# Patient Record
Sex: MALE | Race: WHITE | NOT HISPANIC OR LATINO | Employment: FULL TIME | URBAN - METROPOLITAN AREA
[De-identification: names, ages, dates, MRNs, and addresses within clinical notes are randomized per-mention and may not be internally consistent; named-entity substitution may affect disease eponyms.]

---

## 2017-12-12 ENCOUNTER — HOSPITAL ENCOUNTER (EMERGENCY)
Facility: HOSPITAL | Age: 42
Discharge: PSYCHIATRIC OR SUBSTANCE USE FACILITY | End: 2017-12-13
Attending: EMERGENCY MEDICINE
Payer: COMMERCIAL

## 2017-12-12 DIAGNOSIS — R45.851 SUICIDAL IDEATION: Primary | ICD-10-CM

## 2017-12-12 DIAGNOSIS — F11.11 HISTORY OF HEROIN ABUSE: ICD-10-CM

## 2017-12-12 LAB
ALBUMIN SERPL BCP-MCNC: 3.8 G/DL
ALP SERPL-CCNC: 66 U/L
ALT SERPL W/O P-5'-P-CCNC: 25 U/L
ANION GAP SERPL CALC-SCNC: 10 MMOL/L
APAP SERPL-MCNC: <3 UG/ML
AST SERPL-CCNC: 21 U/L
BASOPHILS # BLD AUTO: 0.02 K/UL
BASOPHILS NFR BLD: 0.3 %
BILIRUB SERPL-MCNC: 0.9 MG/DL
BUN SERPL-MCNC: 17 MG/DL
CALCIUM SERPL-MCNC: 9.4 MG/DL
CHLORIDE SERPL-SCNC: 105 MMOL/L
CO2 SERPL-SCNC: 27 MMOL/L
CREAT SERPL-MCNC: 1.1 MG/DL
DIFFERENTIAL METHOD: ABNORMAL
EOSINOPHIL # BLD AUTO: 0.2 K/UL
EOSINOPHIL NFR BLD: 3.4 %
ERYTHROCYTE [DISTWIDTH] IN BLOOD BY AUTOMATED COUNT: 13.2 %
EST. GFR  (AFRICAN AMERICAN): >60 ML/MIN/1.73 M^2
EST. GFR  (NON AFRICAN AMERICAN): >60 ML/MIN/1.73 M^2
ETHANOL SERPL-MCNC: <10 MG/DL
GLUCOSE SERPL-MCNC: 144 MG/DL
HCT VFR BLD AUTO: 46.5 %
HGB BLD-MCNC: 15.6 G/DL
LYMPHOCYTES # BLD AUTO: 1.8 K/UL
LYMPHOCYTES NFR BLD: 27.8 %
MCH RBC QN AUTO: 30.9 PG
MCHC RBC AUTO-ENTMCNC: 33.5 G/DL
MCV RBC AUTO: 92 FL
MONOCYTES # BLD AUTO: 0.6 K/UL
MONOCYTES NFR BLD: 8.9 %
NEUTROPHILS # BLD AUTO: 3.8 K/UL
NEUTROPHILS NFR BLD: 59.6 %
PLATELET # BLD AUTO: 224 K/UL
PMV BLD AUTO: 8.7 FL
POTASSIUM SERPL-SCNC: 3.8 MMOL/L
PROT SERPL-MCNC: 7.1 G/DL
RBC # BLD AUTO: 5.05 M/UL
SALICYLATES SERPL-MCNC: <5 MG/DL
SODIUM SERPL-SCNC: 142 MMOL/L
WBC # BLD AUTO: 6.43 K/UL

## 2017-12-12 PROCEDURE — 93010 ELECTROCARDIOGRAM REPORT: CPT | Mod: ,,, | Performed by: INTERNAL MEDICINE

## 2017-12-12 PROCEDURE — 93005 ELECTROCARDIOGRAM TRACING: CPT

## 2017-12-12 PROCEDURE — 80307 DRUG TEST PRSMV CHEM ANLYZR: CPT

## 2017-12-12 PROCEDURE — 84443 ASSAY THYROID STIM HORMONE: CPT

## 2017-12-12 PROCEDURE — 84439 ASSAY OF FREE THYROXINE: CPT

## 2017-12-12 PROCEDURE — 80329 ANALGESICS NON-OPIOID 1 OR 2: CPT

## 2017-12-12 PROCEDURE — 80320 DRUG SCREEN QUANTALCOHOLS: CPT

## 2017-12-12 PROCEDURE — 80053 COMPREHEN METABOLIC PANEL: CPT

## 2017-12-12 PROCEDURE — 85025 COMPLETE CBC W/AUTO DIFF WBC: CPT

## 2017-12-12 PROCEDURE — 99285 EMERGENCY DEPT VISIT HI MDM: CPT | Mod: 25

## 2017-12-12 RX ORDER — IBUPROFEN 200 MG
1 TABLET ORAL DAILY
Status: DISCONTINUED | OUTPATIENT
Start: 2017-12-13 | End: 2017-12-13 | Stop reason: HOSPADM

## 2017-12-13 VITALS
HEIGHT: 70 IN | HEART RATE: 55 BPM | OXYGEN SATURATION: 97 % | DIASTOLIC BLOOD PRESSURE: 64 MMHG | SYSTOLIC BLOOD PRESSURE: 134 MMHG | WEIGHT: 190.13 LBS | BODY MASS INDEX: 27.22 KG/M2 | RESPIRATION RATE: 18 BRPM | TEMPERATURE: 98 F

## 2017-12-13 LAB
AMPHET+METHAMPHET UR QL: NORMAL
BARBITURATES UR QL SCN>200 NG/ML: NEGATIVE
BENZODIAZ UR QL SCN>200 NG/ML: NEGATIVE
BILIRUB UR QL STRIP: NEGATIVE
BZE UR QL SCN: NEGATIVE
CANNABINOIDS UR QL SCN: NEGATIVE
CLARITY UR: CLEAR
COLOR UR: YELLOW
CREAT UR-MCNC: 228.1 MG/DL
GLUCOSE UR QL STRIP: ABNORMAL
HGB UR QL STRIP: NEGATIVE
KETONES UR QL STRIP: NEGATIVE
LEUKOCYTE ESTERASE UR QL STRIP: NEGATIVE
METHADONE UR QL SCN>300 NG/ML: NEGATIVE
NITRITE UR QL STRIP: NEGATIVE
OPIATES UR QL SCN: NORMAL
PCP UR QL SCN>25 NG/ML: NEGATIVE
PH UR STRIP: 6 [PH] (ref 5–8)
PROT UR QL STRIP: NEGATIVE
SP GR UR STRIP: 1.02 (ref 1–1.03)
T4 FREE SERPL-MCNC: 1.04 NG/DL
TOXICOLOGY INFORMATION: NORMAL
TSH SERPL DL<=0.005 MIU/L-ACNC: 0.22 UIU/ML
URN SPEC COLLECT METH UR: ABNORMAL
UROBILINOGEN UR STRIP-ACNC: 1 EU/DL

## 2017-12-13 PROCEDURE — 80307 DRUG TEST PRSMV CHEM ANLYZR: CPT

## 2017-12-13 PROCEDURE — 81003 URINALYSIS AUTO W/O SCOPE: CPT | Mod: 59

## 2017-12-13 NOTE — ED NOTES
Pt's room secured per protocol. Pt's belongings secured and pt placed in grey gown and yellow socks. Pt being directly monitored by angel Perez at this time.

## 2017-12-13 NOTE — ED NOTES
Risks and benefits of transfer discussed with patient who verbalizes understanding.  [facility] transfer form signed by patient and MD and added to chart.

## 2017-12-13 NOTE — ED NOTES
Spoke to Lisa at [facility] (Formerly [facility]).    Patient has been accepted by Dr. Alexandra.    Call report to [phone].    Address:  [address] [place] [id number]

## 2017-12-13 NOTE — ED NOTES
Patient's belongings include: two bags of personal clothing, shoes, heavy winter coat, cigarettes and lighter, two smart phones, cell phone , wallet with no cash, sunglasses, toiletries, and baseball cap.

## 2017-12-13 NOTE — ED PROVIDER NOTES
SCRIBE #1 NOTE: I, Db Jose Puentes, am scribing for, and in the presence of, Darleen Pool MD. I have scribed the entire note.      History      Chief Complaint   Patient presents with    Suicidal     Pt reports suicidal thoughts x3days. Last shot up heroin this AM.       Review of patient's allergies indicates:  No Known Allergies     HPI   HPI    12/12/2017, 10:52 PM   History obtained from the patient      History of Present Illness: Mickey Salvador is a 41 y.o. male patient who presents to the Emergency Department for suicidal ideations which onset gradually today. Pt reports relapsing with crystal meth and heroin this morning. Pt states he snorts heroin. Sxs are constant and moderate in severity. Pt has plan to kill himself with gun. There are no mitigating or exacerbating factors noted. Associated sxs include depression, anxiety, sleeplessness.  Pt denies any fever, N/V/D, self injury, HI, hallucinations, CP, LOC, and all other sxs at this time. No further complaints or concerns at this time.       Arrival mode: Personal vehicle      PCP: Angus Nicholas MD       Past Medical History:  Past Medical History:   Diagnosis Date    Hyperlipidemia        Past Surgical History:  Past Surgical History:   Procedure Laterality Date    KIDNEY STONE SURGERY      2004         Family History:  Family History   Problem Relation Age of Onset    Hyperlipidemia Father        Social History:  Social History     Social History Main Topics    Smoking status: Former Smoker    Smokeless tobacco: unknown    Alcohol use unknown    Drug use: Unknown    Sexual activity: unknown       ROS   Review of Systems   Constitutional: Negative for fever.   HENT: Negative for sore throat.    Respiratory: Negative for shortness of breath.    Cardiovascular: Negative for chest pain.   Gastrointestinal: Negative for nausea.   Genitourinary: Negative for dysuria.   Musculoskeletal: Negative for back pain.   Skin: Negative for rash and  wound.   Neurological: Negative for weakness.   Hematological: Does not bruise/bleed easily.   Psychiatric/Behavioral: Positive for sleep disturbance and suicidal ideas. Negative for agitation, hallucinations and self-injury. The patient is nervous/anxious.      Physical Exam      Initial Vitals [12/12/17 2238]   BP Pulse Resp Temp SpO2   (!) 159/75 77 20 98.4 °F (36.9 °C) 98 %      MAP       103          Physical Exam  Nursing Notes and Vital Signs Reviewed.  Constitutional: Patient is in no acute distress. Well-developed and well-nourished.  Head: Atraumatic. Normocephalic.  Eyes: PERRL. EOM intact. Conjunctivae are not pale. No scleral icterus.  ENT: Mucous membranes are moist. Oropharynx is clear and symmetric.    Neck: Supple. Full ROM. No lymphadenopathy.  Cardiovascular: Regular rate. Regular rhythm. No murmurs, rubs, or gallops. Distal pulses are 2+ and symmetric.  Pulmonary/Chest: No respiratory distress. Clear to auscultation bilaterally. No wheezing or rales.  Abdominal: Soft and non-distended.  There is no tenderness.  No rebound, guarding, or rigidity. Good bowel sounds.  Genitourinary: No CVA tenderness  Musculoskeletal: Moves all extremities. No obvious deformities. No edema. No calf tenderness  Skin: Warm and dry.  Neurological:  Alert, awake, and appropriate.  Normal speech.  No acute focal neurological deficits are appreciated.  Psychiatric:               Behavior: cooperative              Mood and Affect: flat affect              Thought Process: within normal limits              Suicidal Ideations: Yes              Suicidal Plan: Specific plan to harm self.  GSW.              Homicidal Ideations: No              Hallucinations: none      ED Course    Procedures  ED Vital Signs:  Vitals:    12/12/17 2238 12/13/17 0045 12/13/17 0302   BP: (!) 159/75 (!) 147/80 134/64   Pulse: 77 61 (!) 55   Resp: 20 20 18   Temp: 98.4 °F (36.9 °C)  98.1 °F (36.7 °C)   TempSrc: Oral     SpO2: 98% 98% 97%   Weight:  "86.3 kg (190 lb 2.4 oz)     Height: 5' 10" (1.778 m)         Abnormal Lab Results:  Labs Reviewed   CBC W/ AUTO DIFFERENTIAL - Abnormal; Notable for the following:        Result Value    MPV 8.7 (*)     All other components within normal limits   COMPREHENSIVE METABOLIC PANEL - Abnormal; Notable for the following:     Glucose 144 (*)     All other components within normal limits   TSH - Abnormal; Notable for the following:     TSH 0.221 (*)     All other components within normal limits   URINALYSIS - Abnormal; Notable for the following:     Glucose, UA 1+ (*)     All other components within normal limits   ACETAMINOPHEN LEVEL - Abnormal; Notable for the following:     Acetaminophen (Tylenol), Serum <3.0 (*)     All other components within normal limits   SALICYLATE LEVEL - Abnormal; Notable for the following:     Salicylate Lvl <5.0 (*)     All other components within normal limits   DRUG SCREEN PANEL, URINE EMERGENCY   ALCOHOL,MEDICAL (ETHANOL)   T4, FREE        All Lab Results:  Results for orders placed or performed during the hospital encounter of 12/12/17   CBC auto differential   Result Value Ref Range    WBC 6.43 3.90 - 12.70 K/uL    RBC 5.05 4.60 - 6.20 M/uL    Hemoglobin 15.6 14.0 - 18.0 g/dL    Hematocrit 46.5 40.0 - 54.0 %    MCV 92 82 - 98 fL    MCH 30.9 27.0 - 31.0 pg    MCHC 33.5 32.0 - 36.0 g/dL    RDW 13.2 11.5 - 14.5 %    Platelets 224 150 - 350 K/uL    MPV 8.7 (L) 9.2 - 12.9 fL    Gran # 3.8 1.8 - 7.7 K/uL    Lymph # 1.8 1.0 - 4.8 K/uL    Mono # 0.6 0.3 - 1.0 K/uL    Eos # 0.2 0.0 - 0.5 K/uL    Baso # 0.02 0.00 - 0.20 K/uL    Gran% 59.6 38.0 - 73.0 %    Lymph% 27.8 18.0 - 48.0 %    Mono% 8.9 4.0 - 15.0 %    Eosinophil% 3.4 0.0 - 8.0 %    Basophil% 0.3 0.0 - 1.9 %    Differential Method Automated    Comprehensive metabolic panel   Result Value Ref Range    Sodium 142 136 - 145 mmol/L    Potassium 3.8 3.5 - 5.1 mmol/L    Chloride 105 95 - 110 mmol/L    CO2 27 23 - 29 mmol/L    Glucose 144 (H) 70 - " 110 mg/dL    BUN, Bld 17 6 - 20 mg/dL    Creatinine 1.1 0.5 - 1.4 mg/dL    Calcium 9.4 8.7 - 10.5 mg/dL    Total Protein 7.1 6.0 - 8.4 g/dL    Albumin 3.8 3.5 - 5.2 g/dL    Total Bilirubin 0.9 0.1 - 1.0 mg/dL    Alkaline Phosphatase 66 55 - 135 U/L    AST 21 10 - 40 U/L    ALT 25 10 - 44 U/L    Anion Gap 10 8 - 16 mmol/L    eGFR if African American >60 >60 mL/min/1.73 m^2    eGFR if non African American >60 >60 mL/min/1.73 m^2   TSH   Result Value Ref Range    TSH 0.221 (L) 0.400 - 4.000 uIU/mL   Urinalysis - clean catch   Result Value Ref Range    Specimen UA Urine, Clean Catch     Color, UA Yellow Yellow, Straw, Radha    Appearance, UA Clear Clear    pH, UA 6.0 5.0 - 8.0    Specific Gravity, UA 1.025 1.005 - 1.030    Protein, UA Negative Negative    Glucose, UA 1+ (A) Negative    Ketones, UA Negative Negative    Bilirubin (UA) Negative Negative    Occult Blood UA Negative Negative    Nitrite, UA Negative Negative    Urobilinogen, UA 1.0 <2.0 EU/dL    Leukocytes, UA Negative Negative   Drug screen panel, emergency   Result Value Ref Range    Benzodiazepines Negative     Methadone metabolites Negative     Cocaine (Metab.) Negative     Opiate Scrn, Ur Presumptive Positive     Barbiturate Screen, Ur Negative     Amphetamine Screen, Ur Presumptive Positive     THC Negative     Phencyclidine Negative     Creatinine, Random Ur 228.1 23.0 - 375.0 mg/dL    Toxicology Information SEE COMMENT    Ethanol   Result Value Ref Range    Alcohol, Medical, Serum <10 <10 mg/dL   Acetaminophen level   Result Value Ref Range    Acetaminophen (Tylenol), Serum <3.0 (L) 10.0 - 20.0 ug/mL   Salicylate level   Result Value Ref Range    Salicylate Lvl <5.0 (L) 15.0 - 30.0 mg/dL   T4, free   Result Value Ref Range    Free T4 1.04 0.71 - 1.51 ng/dL                  The EKG was ordered, reviewed, and independently interpreted by the ED provider.  Interpretation time: 22:56  Rate: 66 BPM  Rhythm: normal sinus rhythm  Interpretation: No acute ST  changes. No STEMI.      The Emergency Provider reviewed the vital signs and test results, which are outlined above.    ED Discussion     10:58 PM: The PEC hold has been issued by Dr. Pool at this time for SI.    1:17 AM: Pt has been medically cleared by Dr. Pool at this time. Reassessed pt at this time. Pt is resting comfortably and appears in no acute distress. There are no psychiatric services offered at this facility. D/w pt all pertinent ED information and plan to transfer to psychiatric facility for psychiatric treatment. Pt verbalizes understanding. Patient being transferred by [facility] for ongoing personal protection en route. Pt will be transported by personnel trained in CPR and CPI. All questions and complaints have been addressed at this time. Pt condition is stable at this time and is clear to transfer to psychiatric facility at this time.   Accepting Facility: [facility]  Accepting Physician: Dr. Alexandra    ED Medication(s):  Medications   nicotine 14 mg/24 hr 1 patch (not administered)       There are no discharge medications for this patient.            Medical Decision Making    Medical Decision Making:   Clinical Tests:   Lab Tests: Reviewed and Ordered  Medical Tests: Ordered and Reviewed           Scribe Attestation:   Scribe #1: I performed the above scribed service and the documentation accurately describes the services I performed. I attest to the accuracy of the note.    Attending:   Physician Attestation Statement for Scribe #1: I, Darleen Pool MD, personally performed the services described in this documentation, as scribed by Db Puentes, in my presence, and it is both accurate and complete.          Clinical Impression       ICD-10-CM ICD-9-CM   1. Suicidal ideation R45.851 V62.84   2. History of heroin abuse Z87.898 305.53       Disposition:   Disposition: Transferred  Condition: Stable         Darleen Pool MD  12/13/17 0553

## 2018-03-12 ENCOUNTER — HOSPITAL ENCOUNTER (EMERGENCY)
Facility: HOSPITAL | Age: 43
Discharge: HOME | End: 2018-03-12
Attending: EMERGENCY MEDICINE
Payer: MEDICAID

## 2018-03-12 VITALS
RESPIRATION RATE: 18 BRPM | DIASTOLIC BLOOD PRESSURE: 78 MMHG | OXYGEN SATURATION: 95 % | HEART RATE: 83 BPM | TEMPERATURE: 98 F | WEIGHT: 190.69 LBS | BODY MASS INDEX: 27.36 KG/M2 | SYSTOLIC BLOOD PRESSURE: 140 MMHG

## 2018-03-12 DIAGNOSIS — A54.9 GONORRHEA: Primary | ICD-10-CM

## 2018-03-12 DIAGNOSIS — R36.9 PENILE DISCHARGE: ICD-10-CM

## 2018-03-12 DIAGNOSIS — N34.2 URETHRITIS: ICD-10-CM

## 2018-03-12 DIAGNOSIS — R30.0 DYSURIA: ICD-10-CM

## 2018-03-12 LAB
BACTERIA #/AREA URNS HPF: ABNORMAL /HPF
BILIRUB UR QL STRIP: NEGATIVE
CLARITY UR: CLEAR
COLOR UR: YELLOW
GLUCOSE UR QL STRIP: NEGATIVE
HGB UR QL STRIP: ABNORMAL
KETONES UR QL STRIP: NEGATIVE
LEUKOCYTE ESTERASE UR QL STRIP: ABNORMAL
MICROSCOPIC COMMENT: ABNORMAL
NITRITE UR QL STRIP: NEGATIVE
PH UR STRIP: 6 [PH] (ref 5–8)
PROT UR QL STRIP: NEGATIVE
RBC #/AREA URNS HPF: 3 /HPF (ref 0–4)
SP GR UR STRIP: 1.02 (ref 1–1.03)
URN SPEC COLLECT METH UR: ABNORMAL
UROBILINOGEN UR STRIP-ACNC: NEGATIVE EU/DL
WBC #/AREA URNS HPF: >100 /HPF (ref 0–5)

## 2018-03-12 PROCEDURE — 25000003 PHARM REV CODE 250: Performed by: NURSE PRACTITIONER

## 2018-03-12 PROCEDURE — 99283 EMERGENCY DEPT VISIT LOW MDM: CPT | Mod: 25

## 2018-03-12 PROCEDURE — 96372 THER/PROPH/DIAG INJ SC/IM: CPT

## 2018-03-12 PROCEDURE — 87491 CHLMYD TRACH DNA AMP PROBE: CPT

## 2018-03-12 PROCEDURE — 63600175 PHARM REV CODE 636 W HCPCS: Performed by: NURSE PRACTITIONER

## 2018-03-12 PROCEDURE — 81000 URINALYSIS NONAUTO W/SCOPE: CPT

## 2018-03-12 RX ORDER — AZITHROMYCIN 250 MG/1
1000 TABLET, FILM COATED ORAL
Status: COMPLETED | OUTPATIENT
Start: 2018-03-12 | End: 2018-03-12

## 2018-03-12 RX ORDER — CEFTRIAXONE 250 MG/1
250 INJECTION, POWDER, FOR SOLUTION INTRAMUSCULAR; INTRAVENOUS
Status: COMPLETED | OUTPATIENT
Start: 2018-03-12 | End: 2018-03-12

## 2018-03-12 RX ADMIN — AZITHROMYCIN 1000 MG: 250 TABLET, FILM COATED ORAL at 09:03

## 2018-03-12 RX ADMIN — CEFTRIAXONE SODIUM 250 MG: 250 INJECTION, POWDER, FOR SOLUTION INTRAMUSCULAR; INTRAVENOUS at 09:03

## 2018-03-13 LAB
C TRACH DNA SPEC QL NAA+PROBE: NOT DETECTED
N GONORRHOEA DNA SPEC QL NAA+PROBE: DETECTED

## 2018-03-13 NOTE — ED PROVIDER NOTES
SCRIBE #1 NOTE: I, Gini Vail, am scribing for, and in the presence of, Thad Kinney NP. I have scribed the HPI, ROS, PEx.    SCRIBE #2 NOTE: I, Sherley Mc, am scribing for, and in the presence of,  Thad Kinney NP. I have scribed the remaining portions of the note not scribed by Scribe #1.     History      Chief Complaint   Patient presents with    Dysuria     cloudy urine; discomfort with urination       Review of patient's allergies indicates:  No Known Allergies     HPI   HPI    3/12/2018, 9:24 PM   History obtained from the patient      History of Present Illness: Mickey Salvador is a 42 y.o. male patient with PMHx of kidney stones presents to the Emergency Department for  Dysuria which onset gradually 1 day. Symptoms are constant and moderate in severity. Pt reports that his urine is very cloudy. No mitigating or exacerbating factors reported. No associated sxs reported. Patient denies any hematuria, fever, chills, diaphoresis, flank pain, urinary urgency, urinary frequency, decreased urine output, and all other sxs at this time. No further complaints or concerns at this time.     Arrival mode: Personal vehicle    PCP: Angus Nicholas MD       Past Medical History:  Past Medical History:   Diagnosis Date    Hyperlipidemia     Renal disorder     kidney stones        Past Surgical History:  Past Surgical History:   Procedure Laterality Date    KIDNEY STONE SURGERY      2004         Family History:  Family History   Problem Relation Age of Onset    Hyperlipidemia Father        Social History:  Social History     Social History Main Topics    Smoking status: Current Every Day Smoker     Packs/day: 1.00     Types: Cigarettes    Smokeless tobacco: Former User    Alcohol use Unknown    Drug use: No    Sexual activity: Unknown       ROS   Review of Systems   Constitutional: Negative for fever.   HENT: Negative for sore throat.    Respiratory: Negative for shortness of breath.    Cardiovascular:  Negative for chest pain.   Gastrointestinal: Negative for nausea.   Genitourinary: Positive for discharge, dysuria and penile pain. Negative for penile swelling, scrotal swelling and testicular pain.   Musculoskeletal: Negative for back pain.   Skin: Negative for rash.   Neurological: Negative for weakness.   Hematological: Does not bruise/bleed easily.       Physical Exam      Initial Vitals [03/12/18 2027]   BP Pulse Resp Temp SpO2   (!) 140/78 83 18 98.3 °F (36.8 °C) 95 %      MAP       98.67          Physical Exam  Vital signs and nursing notes reviewed.  Constitutional: Patient is in no acute distress.  Head: Atraumatic and normocephalic.  Eyes: Normal sclera.  ENT: Moist mucosa.  Cardiovascular: Well perfused.  Pulmonary/Chest: No respiratory distress.  Musculoskeletal: Moves all extremities.  : Inflamed meatus. Mucoid purulent discharge. Normal bilateral testicular lie and position. Scrotum and testes appear normal with no discoloration. No scrotal, testicular, or epididymal tenderness. No masses or hernias around the scrotum, testicles, or inguinal canal.;  Skin: Dry and nl in appearance.  Neurological: Awake and alert.  Psychological: Nl affect.      ED Course    Procedures  ED Vital Signs:  Vitals:    03/12/18 2027   BP: (!) 140/78   Pulse: 83   Resp: 18   Temp: 98.3 °F (36.8 °C)   TempSrc: Oral   SpO2: 95%   Weight: 86.5 kg (190 lb 11.2 oz)       Abnormal Lab Results:  Labs Reviewed   C. TRACHOMATIS/N. GONORRHOEAE BY AMP DNA - Abnormal; Notable for the following:        Result Value    N gonorrhoeae, amplified DNA Detected (*)     All other components within normal limits   URINALYSIS - Abnormal; Notable for the following:     Occult Blood UA Trace (*)     Leukocytes, UA 1+ (*)     All other components within normal limits   URINALYSIS MICROSCOPIC - Abnormal; Notable for the following:     WBC, UA >100 (*)     All other components within normal limits        All Lab Results:  Results for orders placed  or performed during the hospital encounter of 03/12/18   C. trachomatis/N. gonorrhoeae by AMP DNA Urine   Result Value Ref Range    Chlamydia, Amplified DNA Not Detected Not Detected    N gonorrhoeae, amplified DNA Detected (A) Not Detected   Urinalysis Clean Catch   Result Value Ref Range    Specimen UA Urine, Clean Catch     Color, UA Yellow Yellow, Straw, Radha    Appearance, UA Clear Clear    pH, UA 6.0 5.0 - 8.0    Specific Gravity, UA 1.025 1.005 - 1.030    Protein, UA Negative Negative    Glucose, UA Negative Negative    Ketones, UA Negative Negative    Bilirubin (UA) Negative Negative    Occult Blood UA Trace (A) Negative    Nitrite, UA Negative Negative    Urobilinogen, UA Negative <2.0 EU/dL    Leukocytes, UA 1+ (A) Negative   Urinalysis Microscopic   Result Value Ref Range    RBC, UA 3 0 - 4 /hpf    WBC, UA >100 (H) 0 - 5 /hpf    Bacteria, UA Rare None-Occ /hpf    Microscopic Comment SEE COMMENT             The Emergency Provider reviewed the vital signs and test results, which are outlined above.    ED Discussion       10:37 PM: Reassessed pt at this time. Pt is in no distress. Discussed with pt all pertinent ED information and results. Discussed pt diagnosis and plan of treatment. Gave pt all f/u and return to the ED instructions. All questions and concerns were addressed at this time. Pt expresses understanding of information and instructions, and is comfortable with plan to discharge. Pt is stable for discharge.    I discussed with patient and/or family/caretaker that evaluation in the ED does not suggest any emergent or life threatening medical conditions requiring immediate intervention beyond what was provided in the ED, and I believe patient is safe for discharge.  Regardless, an unremarkable evaluation in the ED does not preclude the development or presence of a serious of life threatening condition. As such, patient was instructed to return immediately for any worsening or change in current  symptoms.'      ED Medication(s):  Medications   cefTRIAXone injection 250 mg (250 mg Intramuscular Given 3/12/18 2156)   azithromycin tablet 1,000 mg (1,000 mg Oral Given 3/12/18 2157)       There are no discharge medications for this patient.      Follow-up Information     Angus Nicholas MD. Schedule an appointment as soon as possible for a visit in 2 days.    Specialty:  Internal Medicine  Contact information:  [address]  [facility] [address]  [phone]             [facility] - .    Specialty:  Emergency Medicine  Why:  As needed, If symptoms worsen  Contact information:  [address]  [facility] [address] [phone]                   Medical Decision Making    Medical Decision Making:   Clinical Tests:   Lab Tests: Reviewed and Ordered           Scribe Attestation:   Scribe #1: I performed the above scribed service and the documentation accurately describes the services I performed. I attest to the accuracy of the note.    Attending:   Physician Attestation Statement for Scribe #1: I, Thad Kinney NP, personally performed the services described in this documentation, as scribed by Gini Vail, in my presence, and it is both accurate and complete.       Scribe Attestation:   Scribe #2: I performed the above scribed service and the documentation accurately describes the services I performed. I attest to the accuracy of the note.    Attending Attestation:           Physician Attestation for Scribe:    Physician Attestation Statement for Scribe #2: I, Thad Kinney NP, reviewed documentation, as scribed by Sherley Mc in my presence, and it is both accurate and complete. I also acknowledge and confirm the content of the note done by Scribe #1.          Clinical Impression       ICD-10-CM ICD-9-CM   1. Gonorrhea A54.9 098.0   2. Dysuria R30.0 788.1   3. Urethritis N34.2 597.80   4. Penile discharge R36.9 788.7       Disposition:   Disposition:  Discharged  Condition: Stable           Thad Kinney NP  03/14/18 7828

## 2018-03-16 ENCOUNTER — HOSPITAL ENCOUNTER (EMERGENCY)
Facility: HOSPITAL | Age: 43
Discharge: HOME | End: 2018-03-16
Attending: EMERGENCY MEDICINE
Payer: MEDICAID

## 2018-03-16 VITALS
SYSTOLIC BLOOD PRESSURE: 107 MMHG | WEIGHT: 183 LBS | HEIGHT: 70 IN | DIASTOLIC BLOOD PRESSURE: 56 MMHG | HEART RATE: 60 BPM | BODY MASS INDEX: 26.2 KG/M2 | TEMPERATURE: 99 F | RESPIRATION RATE: 18 BRPM | OXYGEN SATURATION: 97 %

## 2018-03-16 DIAGNOSIS — F14.90 COCAINE USE: ICD-10-CM

## 2018-03-16 DIAGNOSIS — T40.1X1A HEROIN OVERDOSE, ACCIDENTAL OR UNINTENTIONAL, INITIAL ENCOUNTER: Primary | ICD-10-CM

## 2018-03-16 DIAGNOSIS — F19.10 POLYSUBSTANCE ABUSE: ICD-10-CM

## 2018-03-16 DIAGNOSIS — F11.90 OPIATE USE: ICD-10-CM

## 2018-03-16 DIAGNOSIS — F19.10 IV DRUG ABUSE: ICD-10-CM

## 2018-03-16 LAB
ALBUMIN SERPL BCP-MCNC: 3.8 G/DL
ALP SERPL-CCNC: 66 U/L
ALT SERPL W/O P-5'-P-CCNC: 16 U/L
AMPHET+METHAMPHET UR QL: NEGATIVE
ANION GAP SERPL CALC-SCNC: 7 MMOL/L
APAP SERPL-MCNC: <3 UG/ML
AST SERPL-CCNC: 18 U/L
BACTERIA #/AREA URNS HPF: NORMAL /HPF
BARBITURATES UR QL SCN>200 NG/ML: NEGATIVE
BASOPHILS # BLD AUTO: 0.03 K/UL
BASOPHILS NFR BLD: 0.2 %
BENZODIAZ UR QL SCN>200 NG/ML: NEGATIVE
BILIRUB SERPL-MCNC: 0.6 MG/DL
BILIRUB UR QL STRIP: NEGATIVE
BUN SERPL-MCNC: 11 MG/DL
BZE UR QL SCN: NORMAL
CALCIUM SERPL-MCNC: 9.2 MG/DL
CANNABINOIDS UR QL SCN: NEGATIVE
CHLORIDE SERPL-SCNC: 104 MMOL/L
CLARITY UR: CLEAR
CO2 SERPL-SCNC: 27 MMOL/L
COLOR UR: YELLOW
CREAT SERPL-MCNC: 0.8 MG/DL
CREAT UR-MCNC: 257.7 MG/DL
DIFFERENTIAL METHOD: ABNORMAL
EOSINOPHIL # BLD AUTO: 0.1 K/UL
EOSINOPHIL NFR BLD: 0.8 %
ERYTHROCYTE [DISTWIDTH] IN BLOOD BY AUTOMATED COUNT: 13.4 %
EST. GFR  (AFRICAN AMERICAN): >60 ML/MIN/1.73 M^2
EST. GFR  (NON AFRICAN AMERICAN): >60 ML/MIN/1.73 M^2
ETHANOL SERPL-MCNC: <10 MG/DL
GLUCOSE SERPL-MCNC: 143 MG/DL
GLUCOSE UR QL STRIP: NEGATIVE
HCT VFR BLD AUTO: 42.4 %
HGB BLD-MCNC: 14.3 G/DL
HGB UR QL STRIP: NEGATIVE
HYALINE CASTS #/AREA URNS LPF: 0 /LPF
KETONES UR QL STRIP: NEGATIVE
LEUKOCYTE ESTERASE UR QL STRIP: NEGATIVE
LYMPHOCYTES # BLD AUTO: 2.1 K/UL
LYMPHOCYTES NFR BLD: 17.3 %
MCH RBC QN AUTO: 30.8 PG
MCHC RBC AUTO-ENTMCNC: 33.7 G/DL
MCV RBC AUTO: 91 FL
METHADONE UR QL SCN>300 NG/ML: NEGATIVE
MICROSCOPIC COMMENT: NORMAL
MONOCYTES # BLD AUTO: 1.1 K/UL
MONOCYTES NFR BLD: 8.7 %
NEUTROPHILS # BLD AUTO: 8.9 K/UL
NEUTROPHILS NFR BLD: 73 %
NITRITE UR QL STRIP: NEGATIVE
OPIATES UR QL SCN: NORMAL
PCP UR QL SCN>25 NG/ML: NEGATIVE
PH UR STRIP: 6 [PH] (ref 5–8)
PLATELET # BLD AUTO: 250 K/UL
PMV BLD AUTO: 8.6 FL
POTASSIUM SERPL-SCNC: 3.7 MMOL/L
PROT SERPL-MCNC: 6.5 G/DL
PROT UR QL STRIP: ABNORMAL
RBC # BLD AUTO: 4.65 M/UL
RBC #/AREA URNS HPF: 0 /HPF (ref 0–4)
SODIUM SERPL-SCNC: 138 MMOL/L
SP GR UR STRIP: >=1.03 (ref 1–1.03)
TOXICOLOGY INFORMATION: NORMAL
TSH SERPL DL<=0.005 MIU/L-ACNC: 1.1 UIU/ML
URN SPEC COLLECT METH UR: ABNORMAL
UROBILINOGEN UR STRIP-ACNC: 1 EU/DL
WBC # BLD AUTO: 12.25 K/UL
WBC #/AREA URNS HPF: 1 /HPF (ref 0–5)

## 2018-03-16 PROCEDURE — 25000003 PHARM REV CODE 250: Performed by: EMERGENCY MEDICINE

## 2018-03-16 PROCEDURE — 81000 URINALYSIS NONAUTO W/SCOPE: CPT | Mod: 59

## 2018-03-16 PROCEDURE — 63600175 PHARM REV CODE 636 W HCPCS: Performed by: EMERGENCY MEDICINE

## 2018-03-16 PROCEDURE — 80329 ANALGESICS NON-OPIOID 1 OR 2: CPT

## 2018-03-16 PROCEDURE — 96361 HYDRATE IV INFUSION ADD-ON: CPT

## 2018-03-16 PROCEDURE — 80053 COMPREHEN METABOLIC PANEL: CPT

## 2018-03-16 PROCEDURE — 99284 EMERGENCY DEPT VISIT MOD MDM: CPT | Mod: 25

## 2018-03-16 PROCEDURE — 96365 THER/PROPH/DIAG IV INF INIT: CPT

## 2018-03-16 PROCEDURE — 80320 DRUG SCREEN QUANTALCOHOLS: CPT

## 2018-03-16 PROCEDURE — 80307 DRUG TEST PRSMV CHEM ANLYZR: CPT

## 2018-03-16 PROCEDURE — 84443 ASSAY THYROID STIM HORMONE: CPT

## 2018-03-16 PROCEDURE — 85025 COMPLETE CBC W/AUTO DIFF WBC: CPT

## 2018-03-16 RX ORDER — CIPROFLOXACIN 2 MG/ML
400 INJECTION, SOLUTION INTRAVENOUS
Status: COMPLETED | OUTPATIENT
Start: 2018-03-16 | End: 2018-03-16

## 2018-03-16 RX ADMIN — CIPROFLOXACIN 400 MG: 2 INJECTION, SOLUTION INTRAVENOUS at 03:03

## 2018-03-16 RX ADMIN — SODIUM CHLORIDE 1000 ML: 0.9 INJECTION, SOLUTION INTRAVENOUS at 01:03

## 2018-03-16 NOTE — DISCHARGE INSTRUCTIONS
Regarding DRUG ADDICTION, the patient was advised that:  an overdose of controlled substances, illegal drugs, or narcotics, can lead to a coma and death; HIV, AIDS, and hepatitis B and C are serious diseases that can be spread through needles, syringes, and other supplies used to inject narcotics; one may also get an infection if injecting narcotics using dirty needles or supplies; illegal narcotics may be mixed with things like talcum powder, baking soda, or poisons that may get stuck in veins and cause infections or clots that may travel to the heart, lungs, or brain and cause death; snorting or sniffing heroin may cause a hole to develop in the cartilage that separates the two sides of the nose; using pure heroin  increases the risks of an overdose and death; and black tar heroin may contain the bacteria that causes botulism which can be life-threatening.      Patient was advised that the treatment of chronic pain, as opposed to the treatment of acute pain, was beyond my scope of practice and was considered a non-emergent, not life threatening, condition. As per LAC 46:XLVII.4513.D.2.b, the practitioner will not prescribe controlled substances in connection with the treatment of chronic or intractable pain defined by LAC 46:XLV.5809-1878, as pain which persists beyond the usual course of a disease, beyond the expected time for healing from bodily trauma, or pain associated with a long-term incurable or intractable medical illness and/or disease. Furthermore, the patient was advised that pursuant to [place] §971, it is considered unlawful for an individual to knowingly or intentionally acquire or obtain possession of a controlled dangerous substance by misrepresentation, fraud, forgery, deception, or subterfuge; to obtain or attempt to obtain a prescription for a controlled substance and/or legend drug by fraud, theft, misrepresentation, deception or subterfuge; or obtain or seek to obtain any controlled dangerous  substance or a prescription for a controlled dangerous substance from a healthcare practitioner, while being supplied with any controlled dangerous substance or a prescription for a controlled dangerous substance by another healthcare practitioner, without disclosing the fact of the existing prescription to the practitioner from whom the subsequent prescription for a controlled dangerous substance is sought.

## 2018-03-16 NOTE — ED PROVIDER NOTES
SCRIBE #1 NOTE: I, Zandra Reddy, am scribing for, and in the presence of, Jose Valdez Jr., MD. I have scribed the entire note.      History      Chief Complaint   Patient presents with    Drug Overdose     Heroin       Review of patient's allergies indicates:  No Known Allergies     HPI   HPI    3/16/2018, 1:10 AM   History obtained from the patient      History of Present Illness: Mickey Salvador is a 42 y.o. male patient who presents to the Emergency Department for heroin overdose which onset suddenly PTA. Per EMS, patient was agonal on scene. Pt was given 2mg Narcan, which patient responded to. Patient admits to injecting heroin in right arm. Symptoms are constant and moderate in severity. No mitigating or exacerbating factors reported. No associated sxs included. Patient denies any SI, fever, chills, HA, CP, SOB, abd pain, N/V, extremity weakness/numbness, and all other sxs at this time. No further complaints or concerns at this time.     Arrival mode: EMS      PCP: Angus Nicholas MD       Past Medical History:  Past Medical History:   Diagnosis Date    Hyperlipidemia     Renal disorder     kidney stones        Past Surgical History:  Past Surgical History:   Procedure Laterality Date    KIDNEY STONE SURGERY      2004         Family History:  Family History   Problem Relation Age of Onset    Hyperlipidemia Father        Social History:  Social History     Social History Main Topics    Smoking status: Current Every Day Smoker     Packs/day: 1.00     Types: Cigarettes    Smokeless tobacco: Former User    Alcohol use Unknown    Drug use: No    Sexual activity: Unknown       ROS   Review of Systems   Constitutional: Negative for chills and fever.        (+) heroin overdose   HENT: Negative for sore throat.    Respiratory: Negative for shortness of breath.    Cardiovascular: Negative for chest pain.   Gastrointestinal: Negative for abdominal pain, nausea and vomiting.   Genitourinary: Negative for  "dysuria.   Musculoskeletal: Negative for back pain.   Skin: Negative for rash.   Neurological: Negative for weakness, numbness and headaches.   Hematological: Does not bruise/bleed easily.   Psychiatric/Behavioral: Negative for suicidal ideas.   All other systems reviewed and are negative.      Physical Exam      Initial Vitals [03/16/18 0044]   BP Pulse Resp Temp SpO2   139/70 75 16 98.8 °F (37.1 °C) 95 %      MAP       93          Physical Exam  Nursing Notes and Vital Signs Reviewed.  Constitutional: Patient is in no acute distress. Well-developed and well-nourished.  Head: Atraumatic. Normocephalic.  Eyes: PERRL. EOM intact. Conjunctivae are not pale. No scleral icterus.  ENT: Mucous membranes are moist. Oropharynx is clear and symmetric.    Neck: Supple. Full ROM. No lymphadenopathy.  Cardiovascular: Regular rate. Regular rhythm. No murmurs, rubs, or gallops. Distal pulses are 2+ and symmetric.  Pulmonary/Chest: No respiratory distress. Clear to auscultation bilaterally. No wheezing or rales.  Abdominal: Soft and non-distended.  There is no tenderness.  No rebound, guarding, or rigidity. Good bowel sounds.  Genitourinary: No CVA tenderness  Musculoskeletal: Moves all extremities. No obvious deformities. No edema. No calf tenderness.  Skin: Warm and dry. Track steven to R antecubital area.   Neurological:  Alert, awake, and appropriate.  Normal speech.  No acute focal neurological deficits are appreciated. GCS 15. Patient is sleepy, but easily aroused.  Psychiatric: Normal affect. Good eye contact. Appropriate in content. Patient denies SI.    ED Course    Procedures  ED Vital Signs:  Vitals:    03/16/18 0044 03/16/18 0048 03/16/18 0434   BP: 139/70 139/70 (!) 107/56   Pulse: 75 74 60   Resp: 16 19 18   Temp: 98.8 °F (37.1 °C)     TempSrc: Oral     SpO2: 95% 95% 97%   Weight: 83 kg (183 lb)     Height: 5' 10" (1.778 m)         Abnormal Lab Results:  Labs Reviewed   CBC W/ AUTO DIFFERENTIAL - Abnormal; Notable for " the following:        Result Value    MPV 8.6 (*)     Gran # (ANC) 8.9 (*)     Mono # 1.1 (*)     Lymph% 17.3 (*)     All other components within normal limits   COMPREHENSIVE METABOLIC PANEL - Abnormal; Notable for the following:     Glucose 143 (*)     Anion Gap 7 (*)     All other components within normal limits   URINALYSIS - Abnormal; Notable for the following:     Specific Gravity, UA >=1.030 (*)     Protein, UA 1+ (*)     All other components within normal limits   ACETAMINOPHEN LEVEL - Abnormal; Notable for the following:     Acetaminophen (Tylenol), Serum <3.0 (*)     All other components within normal limits   TSH   DRUG SCREEN PANEL, URINE EMERGENCY   ALCOHOL,MEDICAL (ETHANOL)   URINALYSIS MICROSCOPIC        All Lab Results:  Results for orders placed or performed during the hospital encounter of 03/16/18   CBC auto differential   Result Value Ref Range    WBC 12.25 3.90 - 12.70 K/uL    RBC 4.65 4.60 - 6.20 M/uL    Hemoglobin 14.3 14.0 - 18.0 g/dL    Hematocrit 42.4 40.0 - 54.0 %    MCV 91 82 - 98 fL    MCH 30.8 27.0 - 31.0 pg    MCHC 33.7 32.0 - 36.0 g/dL    RDW 13.4 11.5 - 14.5 %    Platelets 250 150 - 350 K/uL    MPV 8.6 (L) 9.2 - 12.9 fL    Gran # (ANC) 8.9 (H) 1.8 - 7.7 K/uL    Lymph # 2.1 1.0 - 4.8 K/uL    Mono # 1.1 (H) 0.3 - 1.0 K/uL    Eos # 0.1 0.0 - 0.5 K/uL    Baso # 0.03 0.00 - 0.20 K/uL    Gran% 73.0 38.0 - 73.0 %    Lymph% 17.3 (L) 18.0 - 48.0 %    Mono% 8.7 4.0 - 15.0 %    Eosinophil% 0.8 0.0 - 8.0 %    Basophil% 0.2 0.0 - 1.9 %    Differential Method Automated    Comprehensive metabolic panel   Result Value Ref Range    Sodium 138 136 - 145 mmol/L    Potassium 3.7 3.5 - 5.1 mmol/L    Chloride 104 95 - 110 mmol/L    CO2 27 23 - 29 mmol/L    Glucose 143 (H) 70 - 110 mg/dL    BUN, Bld 11 6 - 20 mg/dL    Creatinine 0.8 0.5 - 1.4 mg/dL    Calcium 9.2 8.7 - 10.5 mg/dL    Total Protein 6.5 6.0 - 8.4 g/dL    Albumin 3.8 3.5 - 5.2 g/dL    Total Bilirubin 0.6 0.1 - 1.0 mg/dL    Alkaline Phosphatase  66 55 - 135 U/L    AST 18 10 - 40 U/L    ALT 16 10 - 44 U/L    Anion Gap 7 (L) 8 - 16 mmol/L    eGFR if African American >60 >60 mL/min/1.73 m^2    eGFR if non African American >60 >60 mL/min/1.73 m^2   TSH   Result Value Ref Range    TSH 1.103 0.400 - 4.000 uIU/mL   Urinalysis - clean catch   Result Value Ref Range    Specimen UA Urine, Clean Catch     Color, UA Yellow Yellow, Straw, Radha    Appearance, UA Clear Clear    pH, UA 6.0 5.0 - 8.0    Specific Gravity, UA >=1.030 (A) 1.005 - 1.030    Protein, UA 1+ (A) Negative    Glucose, UA Negative Negative    Ketones, UA Negative Negative    Bilirubin (UA) Negative Negative    Occult Blood UA Negative Negative    Nitrite, UA Negative Negative    Urobilinogen, UA 1.0 <2.0 EU/dL    Leukocytes, UA Negative Negative   Drug screen panel, emergency   Result Value Ref Range    Benzodiazepines Negative     Methadone metabolites Negative     Cocaine (Metab.) Presumptive Positive     Opiate Scrn, Ur Presumptive Positive     Barbiturate Screen, Ur Negative     Amphetamine Screen, Ur Negative     THC Negative     Phencyclidine Negative     Creatinine, Random Ur 257.7 23.0 - 375.0 mg/dL    Toxicology Information SEE COMMENT    Ethanol   Result Value Ref Range    Alcohol, Medical, Serum <10 <10 mg/dL   Acetaminophen level   Result Value Ref Range    Acetaminophen (Tylenol), Serum <3.0 (L) 10.0 - 20.0 ug/mL   Urinalysis Microscopic   Result Value Ref Range    RBC, UA 0 0 - 4 /hpf    WBC, UA 1 0 - 5 /hpf    Bacteria, UA Rare None-Occ /hpf    Hyaline Casts, UA 0 0-1/lpf /lpf    Microscopic Comment SEE COMMENT        Imaging Results:  Imaging Results    None                 The Emergency Provider reviewed the vital signs and test results, which are outlined above.    ED Discussion     2:32 AM: Re-evaluated pt. Pt is resting comfortably and is in no acute distress.  D/w pt all pertinent results. D/w pt any concerns expressed at this time. Answered all questions. Pt expresses  understanding at this time.    3:54 AM: Reassessed pt at this time.  Pt states his condition has improved at this time. Discussed with pt and wife all pertinent ED information and results. Discussed pt dx and plan of tx. Gave pt and wife all f/u and return to the ED instructions. All questions and concerns were addressed at this time. Pt and wife express understanding of information and instructions, and is comfortable with plan to discharge. Pt is stable for discharge.  Counseled patient that continuing to use IV drugs could be detrimental to health.    Patient was advised that the treatment of chronic pain, as opposed to the treatment of acute pain, was beyond my scope of practice and was considered a non-emergent, not life threatening, condition. As per LAC 46:XLVII.4513.D.2.b, the practitioner will not prescribe controlled substances in connection with the treatment of chronic or intractable pain defined by LAC 46:XLV.8506-3148, as pain which persists beyond the usual course of a disease, beyond the expected time for healing from bodily trauma, or pain associated with a long-term incurable or intractable medical illness and/or disease. Furthermore, the patient was advised that pursuant to [place] §971, it is considered unlawful for an individual to knowingly or intentionally acquire or obtain possession of a controlled dangerous substance by misrepresentation, fraud, forgery, deception, or subterfuge; to obtain or attempt to obtain a prescription for a controlled substance and/or legend drug by fraud, theft, misrepresentation, deception or subterfuge; or obtain or seek to obtain any controlled dangerous substance or a prescription for a controlled dangerous substance from a healthcare practitioner, while being supplied with any controlled dangerous substance or a prescription for a controlled dangerous substance by another healthcare practitioner, without disclosing the fact of the existing prescription to the  practitioner from whom the subsequent prescription for a controlled dangerous substance is sought.     Regarding DRUG ADDICTION, the patient was advised that:  an overdose of controlled substances, illegal drugs, or narcotics, can lead to a coma and death; HIV, AIDS, and hepatitis B and C are serious diseases that can be spread through needles, syringes, and other supplies used to inject narcotics; one may also get an infection if injecting narcotics using dirty needles or supplies; illegal narcotics may be mixed with things like talcum powder, baking soda, or poisons that may get stuck in veins and cause infections or clots that may travel to the heart, lungs, or brain and cause death; snorting or sniffing heroin may cause a hole to develop in the cartilage that separates the two sides of the nose; using pure heroin  increases the risks of an overdose and death; and black tar heroin may contain the bacteria that causes botulism which can be life-threatening.      ED Medication(s):  Medications   sodium chloride 0.9% bolus 1,000 mL (0 mLs Intravenous Stopped 3/16/18 0436)   ciprofloxacin (CIPRO)400mg/200ml D5W IVPB 400 mg (0 mg Intravenous Stopped 3/16/18 0414)       There are no discharge medications for this patient.      Follow-up Information     Angus Nicholas MD. Schedule an appointment as soon as possible for a visit in 1 week.    Specialty:  Internal Medicine  Contact information:  [address]  [place] [id number] [phone]             [facility] - .    Specialty:  Emergency Medicine  Why:  As needed, If symptoms worsen  Contact information:  [address] [facility] [address] [phone]                   Medical Decision Making    Medical Decision Making:   Clinical Tests:   Lab Tests: Ordered and Reviewed           Scribe Attestation:   Scribe #1: I performed the above scribed service and the documentation accurately describes the services I  performed. I attest to the accuracy of the note.    Attending:   Physician Attestation Statement for Scribe #1: I, Jose Valdez Jr., MD, personally performed the services described in this documentation, as scribed by Zandra Reddy, in my presence, and it is both accurate and complete.          Clinical Impression       ICD-10-CM ICD-9-CM   1. Heroin overdose, accidental or unintentional, initial encounter T40.1X1A 965.01     E850.0   2. Opiate use F11.90 305.50   3. Cocaine use F14.90 305.60   4. IV drug abuse F19.10 305.90   5. Polysubstance abuse F19.10 305.90       Disposition:   Disposition: Discharged  Condition: Stable         Jose Valdez Jr., MD  03/16/18 0451

## 2018-03-16 NOTE — ED NOTES
Patient left er ambulatory with steady gait with girlfriend. Pt speaking full clear sentences without difficulty. Pt verbalized understanding and importance of follow up.